# Patient Record
Sex: FEMALE | Race: WHITE
[De-identification: names, ages, dates, MRNs, and addresses within clinical notes are randomized per-mention and may not be internally consistent; named-entity substitution may affect disease eponyms.]

---

## 2018-05-03 ENCOUNTER — HOSPITAL ENCOUNTER (EMERGENCY)
Dept: HOSPITAL 97 - ER | Age: 28
Discharge: HOME | End: 2018-05-03
Payer: SELF-PAY
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
## 2018-05-03 DIAGNOSIS — T78.40XA: Primary | ICD-10-CM

## 2018-05-03 PROCEDURE — 99283 EMERGENCY DEPT VISIT LOW MDM: CPT

## 2018-05-03 PROCEDURE — 96361 HYDRATE IV INFUSION ADD-ON: CPT

## 2018-05-03 PROCEDURE — 96375 TX/PRO/DX INJ NEW DRUG ADDON: CPT

## 2018-05-03 PROCEDURE — 96374 THER/PROPH/DIAG INJ IV PUSH: CPT

## 2018-05-03 PROCEDURE — 96372 THER/PROPH/DIAG INJ SC/IM: CPT

## 2018-05-03 NOTE — ER
Nurse's Notes                                                                                     

 [facility]                                                                

Name: Jessica Fuentes                                                                             

Age: 28 yrs                                                                                       

Sex: Female                                                                                       

: 1990                                                                                   

MRN: [id number]                                                                                   

Arrival Date: 2018                                                                          

Time: 15:22                                                                                       

Account#: [id number]                                                                            

Bed 7                                                                                             

Private MD: None, None                                                                            

Diagnosis: Acute allergic reaction                                                                

                                                                                                  

Presentation:                                                                                     

                                                                                             

15:41 Presenting complaint: Patient states: im allergic to nuts, at work, i ate a chicken     hj  

      mini, made in peanut oil, now i feel smothering, i feel tingling on all over my face,       

      rfeels flush all over my face, rash, throat is itchy and feels tight;. Transition of        

      care: patient was not received from another setting of care. Onset: The                     

      symptoms/episode began/occurred this morning. Anaphylaxis evaluation, no signs or           

      symptoms of anaphylaxis were noted. Onset of symptoms was May 03, 2018. Initial Sepsis      

      Screen: Does the patient meet any 2 criteria? No. Patient's initial sepsis screen is        

      negative. Does the patient have a suspected source of infection? No. Patient's initial      

      sepsis screen is negative. Care prior to arrival: None.                                     

15:41 Method Of Arrival: Ambulatory                                                             

15:41 Acuity: ELVIA 3                                                                           hj  

                                                                                                  

Triage Assessment:                                                                                

15:44 General: Appears in no apparent distress. uncomfortable, Behavior is calm, cooperative, hj  

      appropriate for age. Pain: Denies pain.                                                     

                                                                                                  

OB/GYN:                                                                                           

15:44 LMP 5/3/2018                                                                            hj  

                                                                                                  

Historical:                                                                                       

- Allergies:                                                                                      

15:43 Peanut;                                                                                 hj  

15:43 Tramadol HCl;                                                                           hj  

- Home Meds:                                                                                      

15:43 None [Active];                                                                          hj  

- PMHx:                                                                                           

15:43 None;                                                                                   hj  

- PSHx:                                                                                           

15:44 ; hand;                                                                        hj  

                                                                                                  

- Immunization history:: Adult Immunizations up to date.                                          

- Social history:: The patient lives with family, Smoking status: Patient/guardian                

  denies using tobacco.                                                                           

- Family history:: not pertinent.                                                                 

- Hospitalizations: : No recent hospitalization is reported.                                      

                                                                                                  

                                                                                                  

Screenin:08 Abuse screen: Denies threats or abuse. Denies injuries from another. Nutritional        aj  

      screening: No deficits noted. Tuberculosis screening: No symptoms or risk factors           

      identified. Fall Risk None identified.                                                      

                                                                                                  

Assessment:                                                                                       

15:44 Respiratory: Airway is patent Respiratory effort is even, unlabored, Respiratory        hj  

      pattern is regular, symmetrical,                                                            

16:08 General: Appears in no apparent distress. comfortable, Behavior is calm, cooperative,   aj  

      appropriate for age. Pain: Denies pain. Neuro: Level of Consciousness is awake, alert,      

      obeys commands, Oriented to person, place, time, situation. Respiratory: Reports            

      shortness of breath Airway is patent Respiratory effort is even, unlabored, Respiratory     

      pattern is regular, symmetrical. GI: No signs and/or symptoms were reported involving       

      the gastrointestinal system. Derm: Skin is pink, warm \T\ dry. Rash noted that is red, on   

      face and scalp.                                                                             

17:43 Reassessment: Patient appears in no apparent distress at this time. Patient and/or      aj  

      family updated on plan of care and expected duration. Pain level reassessed. Patient is     

      alert, oriented x 3, equal unlabored respirations, skin warm/dry/pink. Patient denies       

      pain at this time. Patient states feeling better. Patient states symptoms have improved.    

                                                                                                  

Vital Signs:                                                                                      

15:44  / 75; Pulse 92; Resp 18; Temp 98.2(TE); Pulse Ox 100% on R/A; Weight 80.74 kg;     

      Height 5 ft. 4 in. (162.56 cm);                                                             

16:08  / 81; Pulse 91; Resp 20; Pulse Ox 99% on R/A;                                    aj  

17:43  / 70; Pulse 96; Resp 22; Pulse Ox 100% on R/A;                                   aj  

15:44 Body Mass Index 30.55 (80.74 kg, 162.56 cm)                                               

                                                                                                  

ED Course:                                                                                        

15:22 Patient arrived in ED.                                                                  mr  

15:23 None, None is Private Physician.                                                        mr  

15:43 Triage completed.                                                                       hj  

15:44 Arm band placed on left wrist.                                                          hj  

15:48 Manny Candelaria MD is Attending Physician.                                             wa  

15:57 Oumou Marie, RN is Primary Nurse.                                                     aj  

16:08 Patient has correct armband on for positive identification.                             aj  

16:08 Inserted saline lock: 20 gauge in right antecubital area, using aseptic technique.      aj  

17:43 No provider procedures requiring assistance completed. IV discontinued, intact,         aj  

      bleeding controlled, No redness/swelling at site. Pressure dressing applied.                

                                                                                                  

Administered Medications:                                                                         

16:06 Drug: EPINEPHrine 1mg/mL 1:1,000 0.3 ml Route: Sub-Q; Site: right upper arm;            aj  

17:46 Follow up: Response: No adverse reaction; Marked relief of symptoms                     aj  

16:07 Drug: Decadron - Dexamethasone 10 mg Route: IVP; Site: right antecubital;               aj  

17:45 Follow up: Response: No adverse reaction; Marked relief of symptoms                     aj  

16: Drug: NS 0.9% 1000 ml Route: IV; Rate: 1 bolus; Site: right antecubital;                aj  

17:45 Follow up: Response: No adverse reaction; IV Status: Completed infusion; IV Intake:     aj  

      1000ml                                                                                      

16:07 Drug: Benadryl 12.5 mg Route: IVP; Site: right antecubital;                             aj  

17:45 Follow up: Response: No adverse reaction; Marked relief of symptoms                     aj  

                                                                                                  

                                                                                                  

Intake:                                                                                           

17:45 IV: 1000ml; Total: 1000ml.                                                              aj  

                                                                                                  

Outcome:                                                                                          

17:10 Discharge ordered by MD.                                                                wa  

17:43 Discharged to home ambulatory, with family.                                             aj  

17:43 Condition: good                                                                             

17:43 Discharge instructions given to patient, family, Instructed on discharge instructions,      

      follow up and referral plans. medication usage, Demonstrated understanding of               

      instructions, follow-up care, medications, Prescriptions given X 2.                         

17:46 Patient left the ED.                                                                    aj  

                                                                                                  

Signatures:                                                                                       

Oumou Marie, RN                       RN   Maria De Jesus Calloway Henry, RN                      RN   Manny Kelly MD MD wa                                                   

                                                                                                  

Corrections: (The following items were deleted from the chart)                                    

15:47 15:44 Pulse 92bpm; Resp 18bpm; Pulse Ox 100% RA; Temp 98.2F Temporal; 80.74 kg; Height  hj  

      5 ft. 4 in.; BMI: 30.5; hj                                                                  

                                                                                                  

**************************************************************************************************

## 2018-05-03 NOTE — EDPHYS
Physician Documentation                                                                           

 [facility]                                                                

Name: Jessica Fuentes                                                                             

Age: 28 yrs                                                                                       

Sex: Female                                                                                       

: 1990                                                                                   

MRN: [id number]                                                                                   

Arrival Date: 2018                                                                          

Time: 15:22                                                                                       

Account#: [id number]                                                                            

Bed 7                                                                                             

Private MD: None, None                                                                            

ED Physician Manny Candelaria                                                                      

HPI:                                                                                              

                                                                                             

15:59 This 28 yrs old  Female presents to ER via Ambulatory with complaints of       wa  

      Allergic Reaction.                                                                          

15:59 The patient presents with itching, rash, that is diffuse, redness of skin, shortness of wa  

      breath, throat feels tight. Onset: The symptoms/episode began/occurred 2 hour(s) ago.       

      Associated signs and symptoms: Pertinent positives: rash, shortness of breath,              

      swelling, Pertinent negatives: Altered mental status Light headed nausea. Possible          

      causes: nuts. At home the patient or guardian has treated the symptoms with nothing.        

      Severity of symptoms: At their worst the symptoms were moderate in the emergency            

      department the symptoms are unchanged. allergic to peanut. . The patient has not            

      recently seen a physician.                                                                  

                                                                                                  

OB/GYN:                                                                                           

15:44 LMP 5/3/2018                                                                              

                                                                                                  

Historical:                                                                                       

- Allergies:                                                                                      

15:43 Peanut;                                                                                   

15:43 Tramadol HCl;                                                                           hj  

- Home Meds:                                                                                      

15:43 None [Active];                                                                          hj  

- PMHx:                                                                                           

15:43 None;                                                                                   hj  

- PSHx:                                                                                           

15:44 ; hand;                                                                        hj  

                                                                                                  

- Immunization history:: Adult Immunizations up to date.                                          

- Social history:: The patient lives with family, Smoking status: Patient/guardian                

  denies using tobacco.                                                                           

- Family history:: not pertinent.                                                                 

- Hospitalizations: : No recent hospitalization is reported.                                      

                                                                                                  

                                                                                                  

ROS:                                                                                              

16:06 Constitutional: Negative for fever, chills, and weight loss, Eyes: Negative for injury, wa  

      pain, redness, and discharge, Neck: Negative for injury, pain, and swelling,                

      Cardiovascular: Negative for chest pain, palpitations, and edema, Abdomen/GI: Negative      

      for abdominal pain, nausea, vomiting, diarrhea, and constipation, Back: Negative for        

      injury and pain, : Negative for injury, bleeding, discharge, and swelling,                

      MS/Extremity: Negative for injury and deformity, Neuro: Negative for headache,              

      weakness, numbness, tingling, and seizure, Psych: Negative for depression, anxiety,         

      suicide ideation, homicidal ideation, and hallucinations.                                   

16:06 ENT: Positive for smothering sensation.                                                     

16:06 Cardiovascular: Negative for chest pain, edema, orthopnea, palpitations.                    

16:06 Respiratory: Positive for shortness of breath.                                              

16:06 Skin: Positive for rash, Negative for swelling.                                             

16:06 All other systems are negative.                                                             

                                                                                                  

Exam:                                                                                             

16:08 Constitutional:  This is a well developed, well nourished patient who is awake, alert,  wa  

      and in no acute distress. Head/Face:  Normocephalic, atraumatic. Eyes:  Pupils equal        

      round and reactive to light, extra-ocular motions intact.  Lids and lashes normal.          

      Conjunctiva and sclera are non-icteric and not injected.  Cornea within normal limits.      

      Periorbital areas with no swelling, redness, or edema. ENT:  Nares patent. No nasal         

      discharge, no septal abnormalities noted.  Tympanic membranes are normal and external       

      auditory canals are clear.  Oropharynx with no redness, swelling, or masses, exudates,      

      or evidence of obstruction, uvula midline.  Mucous membranes moist. Neck:  Trachea          

      midline, no thyromegaly or masses palpated, and no cervical lymphadenopathy.  Supple,       

      full range of motion without nuchal rigidity, or vertebral point tenderness.  No            

      Meningismus. Chest/axilla:  Normal chest wall appearance and motion.  Nontender with no     

      deformity.  No lesions are appreciated. Cardiovascular:  Regular rate and rhythm with a     

      normal S1 and S2.  No gallops, murmurs, or rubs.  Normal PMI, no JVD.  No pulse             

      deficits. Abdomen/GI:  Soft, non-tender, with normal bowel sounds.  No distension or        

      tympany.  No guarding or rebound.  No evidence of tenderness throughout. Back:  No          

      spinal tenderness.  No costovertebral tenderness.  Full range of motion. MS/ Extremity:     

       Pulses equal, no cyanosis.  Neurovascular intact.  Full, normal range of motion.           

      Neuro:  Awake and alert, GCS 15, oriented to person, place, time, and situation.            

      Cranial nerves II-XII grossly intact.  Motor strength 5/5 in all extremities.  Sensory      

      grossly intact.  Cerebellar exam normal.  Normal gait. Psych:  Awake, alert, with           

      orientation to person, place and time.  Behavior, mood, and affect are within normal        

      limits.                                                                                     

16:08 Respiratory: the patient does not display signs of respiratory distress,  Respirations:     

      normal, Breath sounds: mildly decreased bilaterally.                                        

16:08 Skin: diffuse patchy areas of redness. worse on ear lobes and behind the ears.              

                                                                                                  

Vital Signs:                                                                                      

15:44  / 75; Pulse 92; Resp 18; Temp 98.2(TE); Pulse Ox 100% on R/A; Weight 80.74 kg;   hj  

      Height 5 ft. 4 in. (162.56 cm);                                                             

16:08  / 81; Pulse 91; Resp 20; Pulse Ox 99% on R/A;                                    aj  

17:43  / 70; Pulse 96; Resp 22; Pulse Ox 100% on R/A;                                   aj  

15:44 Body Mass Index 30.55 (80.74 kg, 162.56 cm)                                             hj  

                                                                                                  

MDM:                                                                                              

15:48 Patient medically screened.                                                             wa  

16:09 Differential diagnosis: anaphylaxis, angioedema, urticaria.                             wa  

17:08 Data reviewed: vital signs, nurses notes, lab test result(s). Response to treatment:    wa  

      the patient's symptoms have markedly improved after treatment.                              

17:13 ED course: markedly improved. throat sensation resolved. rash improved prior to d/c.    wa  

                                                                                                  

                                                                                             

15:57 Order name: Cardiac monitoring; Complete Time: 16:08                                    wa  

                                                                                             

15:57 Order name: Pulse Ox Monitoring; Complete Time: 16:06                                   wa  

                                                                                             

15:57 Order name: Oxygen; Complete Time: 16:06                                                wa  

                                                                                                  

Administered Medications:                                                                         

16:06 Drug: EPINEPHrine 1mg/mL 1:1,000 0.3 ml Route: Sub-Q; Site: right upper arm;            aj  

17:46 Follow up: Response: No adverse reaction; Marked relief of symptoms                       

16:07 Drug: Decadron - Dexamethasone 10 mg Route: IVP; Site: right antecubital;               aj  

17:45 Follow up: Response: No adverse reaction; Marked relief of symptoms                       

16:07 Drug: NS 0.9% 1000 ml Route: IV; Rate: 1 bolus; Site: right antecubital;                aj  

17:45 Follow up: Response: No adverse reaction; IV Status: Completed infusion; IV Intake:     aj  

      1000ml                                                                                      

16:07 Drug: Benadryl 12.5 mg Route: IVP; Site: right antecubital;                             aj  

17:45 Follow up: Response: No adverse reaction; Marked relief of symptoms                       

                                                                                                  

                                                                                                  

Disposition:                                                                                      

18 17:10 Discharged to Home. Impression: Acute allergic reaction.                           

- Condition is Stable.                                                                            

                                                                                                  

- Prescriptions for Prednisone 20 mg Oral Tablet - take 2 tablet by ORAL route once               

  daily for 5 days; 10 tablet. EpiPen 0.3 mg Injection auto- injector - inject 1 pen by           

  INTRAMUSCULAR route as directed Inject into the outer portion of the thigh, through             

  clothing if necessary. Indicated in the emergency treatment of allergic reactions; 1            

  box.                                                                                            

- Medication Reconciliation Form, Thank You Letter, Antibiotic Education, Prescription            

  Opioid Use form.                                                                                

- Follow up: Private Physician; When: 2 - 3 days; Reason: Re-evaluation by your                   

  physician.                                                                                      

- Problem is new.                                                                                 

- Symptoms have improved.                                                                         

- Notes: take prednisone as prescribed. carry epipen and use in a severe allergic                 

  reaction situation as discussed with you                                                        

                                                                                                  

                                                                                                  

Signatures:                                                                                       

Oumou Marie RN RN aj Joaquin, Henry, RN RN                                                      

Manny Candelaria MD MD wa                                                   

                                                                                                  

Corrections: (The following items were deleted from the chart)                                    

17:46 17:10 2018 17:10 Discharged to Home. Impression: Acute allergic reaction.         aj  

      Condition is Stable. Forms are Medication Reconciliation Form, Thank You Letter,            

      Antibiotic Education, Prescription Opioid Use. Follow up: Private Physician; When: 2 -      

      3 days; Reason: Re-evaluation by your physician. Problem is new. Symptoms have              

      improved. wa                                                                                

                                                                                                  

**************************************************************************************************

## 2018-06-10 ENCOUNTER — HOSPITAL ENCOUNTER (EMERGENCY)
Dept: HOSPITAL 97 - ER | Age: 28
Discharge: HOME | End: 2018-06-10
Payer: SELF-PAY

## 2018-06-10 DIAGNOSIS — B02.9: Primary | ICD-10-CM

## 2018-06-10 DIAGNOSIS — Z91.010: ICD-10-CM

## 2018-06-10 DIAGNOSIS — Z88.0: ICD-10-CM

## 2018-06-10 DIAGNOSIS — Z88.6: ICD-10-CM

## 2018-06-10 DIAGNOSIS — I25.2: ICD-10-CM

## 2018-06-10 DIAGNOSIS — F17.210: ICD-10-CM

## 2018-06-10 PROCEDURE — 99283 EMERGENCY DEPT VISIT LOW MDM: CPT

## 2018-06-10 NOTE — ER
Nurse's Notes                                                                                     

 [facility]                                                                

Name: Jessica Fuentes                                                                             

Age: 28 yrs                                                                                       

Sex: Female                                                                                       

: 1990                                                                                   

MRN: [id number]                                                                                   

Arrival Date: 06/10/2018                                                                          

Time: 20:27                                                                                       

Account#: [id number]                                                                            

Bed 28                                                                                            

Private MD: None, None                                                                            

Diagnosis: Herpes zoster right upper thigh                                                        

                                                                                                  

Presentation:                                                                                     

06/10                                                                                             

21:00 Presenting complaint: Patient states: Reports she had a rash pop up about two or three  ea  

      days ago, that started when she got her shorts wet at work. States "it's in my inner        

      right thigh and it is draining yellow stuff and it hurts". Transition of care: patient      

      was not received from another setting of care. Onset of symptoms was Theresa 10, 2018.         

      Risk Assessment: Do you want to hurt yourself or someone else? Patient reports no           

      desire to harm self or others. Initial Sepsis Screen: Does the patient meet any 2           

      criteria? No. Patient's initial sepsis screen is negative. Does the patient have a          

      suspected source of infection? Yes:. Care prior to arrival: None.                           

21:00 Method Of Arrival: Ambulatory                                                           ea  

21:00 Acuity: ELVIA 5                                                                           ea  

                                                                                                  

Triage Assessment:                                                                                

21:10 General: Appears uncomfortable, Behavior is calm, cooperative, appropriate for age.     ea  

      Derm: Rash noted that is red, Bruising that is dark purple.                                 

21:10 Neuro: No deficits noted. Cardiovascular: No deficits noted. Cardiovascular: Reports    ea  

      since cardiac history, reports having heart attack last year. Respiratory: No deficits      

      noted.                                                                                      

                                                                                                  

OB/GYN:                                                                                           

21:00 LMP 5/15/2018                                                                           ea  

                                                                                                  

Historical:                                                                                       

- Allergies:                                                                                      

21:03 PENICILLINS;                                                                            tl3 

21:03 Peanut;                                                                                 tl3 

21:03 Tramadol HCl;                                                                           tl3 

- PMHx:                                                                                           

21:03 Myocardial infarction;                                                                  tl3 

- PSHx:                                                                                           

21:03 ;                                                                              tl3 

                                                                                                  

- Immunization history:: Adult Immunizations up to date.                                          

- Social history:: Smoking status: Patient uses tobacco products, smokes one pack                 

  cigarettes per day.                                                                             

- Ebola Screening: : No symptoms or risks identified at this time.                                

                                                                                                  

                                                                                                  

Screenin:03 Abuse screen: Denies threats or abuse. Nutritional screening: No deficits noted.        tl3 

      Tuberculosis screening: No symptoms or risk factors identified. Fall Risk None              

      identified.                                                                                 

                                                                                                  

Assessment:                                                                                       

20:51 General: Appears in no apparent distress. comfortable, well groomed, well developed,    tl3 

      well nourished, Behavior is calm, cooperative, appropriate for age. Pain: Complains of      

      pain in inner right thigh. Neuro: Level of Consciousness is awake, alert, obeys             

      commands, Oriented to person, place, time, situation, Appropriate for age.                  

      Cardiovascular: Heart tones S1 S2 present Patient's skin is warm and dry. Respiratory:      

      Airway is patent Respiratory effort is even, unlabored, Respiratory pattern is regular,     

      symmetrical. GI: No signs and/or symptoms were reported involving the gastrointestinal      

      system. : No signs and/or symptoms were reported regarding the genitourinary system.      

      EENT: No deficits noted. No signs and/or symptoms were reported regarding the EENT          

      system. Derm: Rash noted that is itchy, raised, recently shaved pubic area, several         

      areas of redness, pt popped it herself, but now feels it has spread under her skin.         

      Musculoskeletal: No deficits noted. No signs and/or symptoms reported regarding the         

      musculoskeletal system.                                                                     

                                                                                                  

Vital Signs:                                                                                      

21:03  / 70; Pulse 92; Resp 18; Pulse Ox 99% ;                                          tl3 

                                                                                                  

ED Course:                                                                                        

20:27 Patient arrived in ED.                                                                  ds1 

20:27 None, None is Private Physician.                                                        ds1 

20:44 Tami Mary, RN is Primary Nurse.                                                    rk2 

20:47 Rios Reed MD is Attending Physician.                                                    pkl 

21:00 Arm band placed on right wrist. Patient placed in an exam room, on a stretcher, on      ea  

      pulse oximetry.                                                                             

21:03 Patient has correct armband on for positive identification. Bed in low position. Call   tl3 

      light in reach. Side rails up X 1. Pulse ox on. NIBP on.                                    

21:03 No provider procedures requiring assistance completed. Patient did not have IV access   tl3 

      during this emergency room visit.                                                           

21:10 Triage completed.                                                                       ea  

                                                                                                  

Administered Medications:                                                                         

21:28 Drug: Tylenol #3 (300 mg-30 mg) 1 tablet Route: PO;                                     rk2 

21:41 Follow up: Response: No adverse reaction                                                rk2 

21:28 Drug: Acyclovir 800 mg Route: PO;                                                       rk2 

21:40 Follow up: Response: No adverse reaction                                                rk2 

                                                                                                  

                                                                                                  

Outcome:                                                                                          

21:16 Discharge ordered by MD.                                                                pkl 

21:39 Discharged to home ambulatory.                                                          rk2 

21:39 Condition: good                                                                             

21:39 Discharge instructions given to patient, Prescriptions given X 2.                           

21:40 Patient left the ED.                                                                    rk2 

                                                                                                  

Signatures:                                                                                       

Rios Reed MD MD   pkl                                                  

Shoshana Perez                                ds1                                                  

Olga Fuller RN                      Tami Alves ea, RN                      RN   rk2                                                  

Miya Robb RN                       RN   tl3                                                  

                                                                                                  

Corrections: (The following items were deleted from the chart)                                    

 21:10 General: Appears uncomfortable, christos sher  

 21:10 Derm: Rash noted that is red, Bruising that is dark purple, christos sher  

                                                                                                  

**************************************************************************************************

## 2018-06-10 NOTE — EDPHYS
Physician Documentation                                                                           

 [facility]                                                                

Name: Jessica Fuentes                                                                             

Age: 28 yrs                                                                                       

Sex: Female                                                                                       

: 1990                                                                                   

MRN: [id number]                                                                                   

Arrival Date: 06/10/2018                                                                          

Time: 20:27                                                                                       

Account#: [id number]                                                                            

Bed 28                                                                                            

Private MD: None, None                                                                            

ED Physician Rios Reed                                                                             

HPI:                                                                                              

06/10                                                                                             

21:10 This 28 yrs old  Female presents to ER via Un[facility] with complaints of Rash. pkl 

21:10 The rash is located on the right upper inner thigh. The rash can be described as        pkl 

      blister - like eruptions. Onset: The symptoms/episode began/occurred 5 day(s) ago.          

      Associated signs and symptoms: Pertinent positives: Pain.                                   

                                                                                                  

OB/GYN:                                                                                           

21:00 LMP 5/15/2018                                                                           ea  

                                                                                                  

Historical:                                                                                       

- Allergies:                                                                                      

21:03 PENICILLINS;                                                                            tl3 

21:03 Peanut;                                                                                 tl3 

21:03 Tramadol HCl;                                                                           tl3 

- PMHx:                                                                                           

21:03 Myocardial infarction;                                                                  tl3 

- PSHx:                                                                                           

21:03 ;                                                                              tl3 

                                                                                                  

- Immunization history:: Adult Immunizations up to date.                                          

- Social history:: Smoking status: Patient uses tobacco products, smokes one pack                 

  cigarettes per day.                                                                             

- Ebola Screening: : No symptoms or risks identified at this time.                                

                                                                                                  

                                                                                                  

ROS:                                                                                              

21:10 Eyes: Negative for injury, pain, redness, and discharge, ENT: Negative for injury,      pkl 

      pain, and discharge, Neck: Negative for injury, pain, and swelling, Cardiovascular:         

      Negative for chest pain, palpitations, and edema, Respiratory: Negative for shortness       

      of breath, cough, wheezing, and pleuritic chest pain, Abdomen/GI: Negative for              

      abdominal pain, nausea, vomiting, diarrhea, and constipation, Back: Negative for injury     

      and pain, : Negative for injury, bleeding, discharge, and swelling.                       

21:10 Skin: Positive for rash, of the right  upper  inner  thigh.                                 

21:10 Neuro: Negative for altered mental status.                                                  

                                                                                                  

Exam:                                                                                             

21:10 Head/Face:  Normocephalic, atraumatic. Eyes:  Pupils equal round and reactive to light, pkl 

      extra-ocular motions intact.  Lids and lashes normal.  Conjunctiva and sclera are           

      non-icteric and not injected.  Cornea within normal limits.  Periorbital areas with no      

      swelling, redness, or edema. ENT:  Nares patent. No nasal discharge, no septal              

      abnormalities noted.  Tympanic membranes are normal and external auditory canals are        

      clear.  Oropharynx with no redness, swelling, or masses, exudates, or evidence of           

      obstruction, uvula midline.  Mucous membranes moist. Neck:  Trachea midline, no             

      thyromegaly or masses palpated, and no cervical lymphadenopathy.  Supple, full range of     

      motion without nuchal rigidity, or vertebral point tenderness.  No Meningismus.             

      Chest/axilla:  Normal chest wall appearance and motion.  Nontender with no deformity.       

      No lesions are appreciated. Cardiovascular:  Regular rate and rhythm with a normal S1       

      and S2.  No gallops, murmurs, or rubs.  Normal PMI, no JVD.  No pulse deficits.             

      Respiratory:  Lungs have equal breath sounds bilaterally, clear to auscultation and         

      percussion.  No rales, rhonchi or wheezes noted.  No increased work of breathing, no        

      retractions or nasal flaring. Abdomen/GI:  Soft, non-tender, with normal bowel sounds.      

      No distension or tympany.  No guarding or rebound.  No evidence of tenderness               

      throughout. Back:  No spinal tenderness.  No costovertebral tenderness.  Full range of      

      motion. Neuro:  Awake and alert, GCS 15, oriented to person, place, time, and               

      situation.  Cranial nerves II-XII grossly intact.  Motor strength 5/5 in all                

      extremities.  Sensory grossly intact.  Cerebellar exam normal.  Normal gait.                

21:10 Skin: on the right  upper  inner  thigh.                                                    

21:10 Neuro: Orientation: is normal, Mentation: is normal, Cranial nerves: grossly normal,        

      Cerebellar function: is grossly normal, Motor: is normal, Gait: is steady.                  

21:10 Skin: rash can be described as blister - like  eruptions.                               pkl 

                                                                                                  

Vital Signs:                                                                                      

21:03  / 70; Pulse 92; Resp 18; Pulse Ox 99% ;                                          tl3 

                                                                                                  

MDM:                                                                                              

20:47 Patient medically screened.                                                             pkl 

21:10 Data reviewed: vital signs, nurses notes.                                               pkl 

                                                                                                  

Administered Medications:                                                                         

21:28 Drug: Tylenol #3 (300 mg-30 mg) 1 tablet Route: PO;                                     rk2 

21:41 Follow up: Response: No adverse reaction                                                rk2 

21:28 Drug: Acyclovir 800 mg Route: PO;                                                       rk2 

21:40 Follow up: Response: No adverse reaction                                                rk2 

                                                                                                  

                                                                                                  

Disposition:                                                                                      

06/10/18 21:16 Discharged to Home. Impression: Herpes zoster right upper thigh.                   

- Condition is Stable.                                                                            

                                                                                                  

- Prescriptions for Tylenol- Codeine #3 300-30 mg Oral Tablet - take 1 tablet by ORAL             

  route every 8 hours As needed; 20 tablet. Acyclovir 400 mg Oral Tablet - take 2                 

  tablet by ORAL route every 8 hours; 60 tablet.                                                  

- Medication Reconciliation Form, Thank You Letter, Antibiotic Education, Prescription            

  Opioid Use form.                                                                                

- Follow up: Private Physician; When: 2 - 3 days; Reason: Re-evaluation by your                   

  physician.                                                                                      

- Problem is new.                                                                                 

- Symptoms are unchanged.                                                                         

                                                                                                  

                                                                                                  

                                                                                                  

Signatures:                                                                                       

Rios Reed MD MD   pkl                                                  

Olga Fuller RN                      RN   ea                                                   

Tami Mary RN                      RN   rk2                                                  

Miya Robb RN                       RN   tl3                                                  

                                                                                                  

Corrections: (The following items were deleted from the chart)                                    

21:40 21:16 06/10/2018 21:16 Discharged to Home. Impression: Herpes zoster right upper thigh. rk2 

      Condition is Stable. Forms are Medication Reconciliation Form, Thank You Letter,            

      Antibiotic Education, Prescription Opioid Use. Follow up: Private Physician; When: 2 -      

      3 days; Reason: Re-evaluation by your physician. Problem is new. Symptoms are               

      unchanged. pkl                                                                              

                                                                                                  

**************************************************************************************************

## 2020-09-16 ENCOUNTER — HOSPITAL ENCOUNTER (EMERGENCY)
Facility: HOSPITAL | Age: 30
Discharge: HOME | End: 2020-09-17
Attending: EMERGENCY MEDICINE | Admitting: EMERGENCY MEDICINE

## 2020-09-16 DIAGNOSIS — K59.00 CONSTIPATION, UNSPECIFIED CONSTIPATION TYPE: Primary | ICD-10-CM

## 2020-09-16 LAB
6-ACETYL MORPHINE: NEGATIVE
ALBUMIN SERPL-MCNC: 3.96 G/DL (ref 3.5–5.2)
ALBUMIN/GLOB SERPL: 1.4 G/DL
ALP SERPL-CCNC: 53 U/L (ref 39–117)
ALT SERPL W P-5'-P-CCNC: 11 U/L (ref 1–33)
AMPHET+METHAMPHET UR QL: POSITIVE
ANION GAP SERPL CALCULATED.3IONS-SCNC: 11.4 MMOL/L (ref 5–15)
AST SERPL-CCNC: 19 U/L (ref 1–32)
B-HCG UR QL: NEGATIVE
BARBITURATES UR QL SCN: NEGATIVE
BASOPHILS # BLD AUTO: 0.06 10*3/MM3 (ref 0–0.2)
BASOPHILS NFR BLD AUTO: 0.8 % (ref 0–1.5)
BENZODIAZ UR QL SCN: NEGATIVE
BILIRUB SERPL-MCNC: 0.2 MG/DL (ref 0–1.2)
BILIRUB UR QL STRIP: NEGATIVE
BUN SERPL-MCNC: 7 MG/DL (ref 6–20)
BUN/CREAT SERPL: 10.1 (ref 7–25)
BUPRENORPHINE SERPL-MCNC: NEGATIVE NG/ML
CALCIUM SPEC-SCNC: 8.5 MG/DL (ref 8.6–10.5)
CANNABINOIDS SERPL QL: NEGATIVE
CHLORIDE SERPL-SCNC: 102 MMOL/L (ref 98–107)
CLARITY UR: CLEAR
CO2 SERPL-SCNC: 26.6 MMOL/L (ref 22–29)
COCAINE UR QL: NEGATIVE
COLOR UR: YELLOW
CREAT SERPL-MCNC: 0.69 MG/DL (ref 0.57–1)
DEPRECATED RDW RBC AUTO: 42.8 FL (ref 37–54)
EOSINOPHIL # BLD AUTO: 0.1 10*3/MM3 (ref 0–0.4)
EOSINOPHIL NFR BLD AUTO: 1.3 % (ref 0.3–6.2)
ERYTHROCYTE [DISTWIDTH] IN BLOOD BY AUTOMATED COUNT: 12.6 % (ref 12.3–15.4)
GFR SERPL CREATININE-BSD FRML MDRD: 99 ML/MIN/1.73
GLOBULIN UR ELPH-MCNC: 2.8 GM/DL
GLUCOSE SERPL-MCNC: 81 MG/DL (ref 65–99)
GLUCOSE UR STRIP-MCNC: NEGATIVE MG/DL
HCT VFR BLD AUTO: 36.1 % (ref 34–46.6)
HGB BLD-MCNC: 11.7 G/DL (ref 12–15.9)
HGB UR QL STRIP.AUTO: NEGATIVE
IMM GRANULOCYTES # BLD AUTO: 0.01 10*3/MM3 (ref 0–0.05)
IMM GRANULOCYTES NFR BLD AUTO: 0.1 % (ref 0–0.5)
KETONES UR QL STRIP: NEGATIVE
LEUKOCYTE ESTERASE UR QL STRIP.AUTO: NEGATIVE
LYMPHOCYTES # BLD AUTO: 1.71 10*3/MM3 (ref 0.7–3.1)
LYMPHOCYTES NFR BLD AUTO: 21.7 % (ref 19.6–45.3)
MCH RBC QN AUTO: 29.9 PG (ref 26.6–33)
MCHC RBC AUTO-ENTMCNC: 32.4 G/DL (ref 31.5–35.7)
MCV RBC AUTO: 92.3 FL (ref 79–97)
METHADONE UR QL SCN: NEGATIVE
MONOCYTES # BLD AUTO: 0.61 10*3/MM3 (ref 0.1–0.9)
MONOCYTES NFR BLD AUTO: 7.8 % (ref 5–12)
NEUTROPHILS NFR BLD AUTO: 5.38 10*3/MM3 (ref 1.7–7)
NEUTROPHILS NFR BLD AUTO: 68.3 % (ref 42.7–76)
NITRITE UR QL STRIP: NEGATIVE
NRBC BLD AUTO-RTO: 0 /100 WBC (ref 0–0.2)
OPIATES UR QL: NEGATIVE
OXYCODONE UR QL SCN: NEGATIVE
PCP UR QL SCN: NEGATIVE
PH UR STRIP.AUTO: 8 [PH] (ref 5–8)
PLATELET # BLD AUTO: 261 10*3/MM3 (ref 140–450)
PMV BLD AUTO: 11.1 FL (ref 6–12)
POTASSIUM SERPL-SCNC: 4 MMOL/L (ref 3.5–5.2)
PROT SERPL-MCNC: 6.8 G/DL (ref 6–8.5)
PROT UR QL STRIP: NEGATIVE
RBC # BLD AUTO: 3.91 10*6/MM3 (ref 3.77–5.28)
SODIUM SERPL-SCNC: 140 MMOL/L (ref 136–145)
SP GR UR STRIP: <=1.005 (ref 1–1.03)
UROBILINOGEN UR QL STRIP: NORMAL
WBC # BLD AUTO: 7.87 10*3/MM3 (ref 3.4–10.8)

## 2020-09-16 PROCEDURE — 80307 DRUG TEST PRSMV CHEM ANLYZR: CPT | Performed by: EMERGENCY MEDICINE

## 2020-09-16 PROCEDURE — 87177 OVA AND PARASITES SMEARS: CPT | Performed by: EMERGENCY MEDICINE

## 2020-09-16 PROCEDURE — 99283 EMERGENCY DEPT VISIT LOW MDM: CPT

## 2020-09-16 PROCEDURE — 87209 SMEAR COMPLEX STAIN: CPT | Performed by: EMERGENCY MEDICINE

## 2020-09-16 PROCEDURE — 85025 COMPLETE CBC W/AUTO DIFF WBC: CPT | Performed by: EMERGENCY MEDICINE

## 2020-09-16 PROCEDURE — 81025 URINE PREGNANCY TEST: CPT | Performed by: EMERGENCY MEDICINE

## 2020-09-16 PROCEDURE — 81003 URINALYSIS AUTO W/O SCOPE: CPT | Performed by: EMERGENCY MEDICINE

## 2020-09-16 PROCEDURE — 80053 COMPREHEN METABOLIC PANEL: CPT | Performed by: EMERGENCY MEDICINE

## 2020-09-17 VITALS
DIASTOLIC BLOOD PRESSURE: 68 MMHG | TEMPERATURE: 98.1 F | HEIGHT: 66 IN | WEIGHT: 162 LBS | RESPIRATION RATE: 16 BRPM | SYSTOLIC BLOOD PRESSURE: 113 MMHG | BODY MASS INDEX: 26.03 KG/M2 | OXYGEN SATURATION: 99 % | HEART RATE: 95 BPM

## 2020-09-17 RX ORDER — POLYETHYLENE GLYCOL 3350 17 G/17G
17 POWDER, FOR SOLUTION ORAL DAILY
Qty: 5 PACKET | Refills: 0 | Status: SHIPPED | OUTPATIENT
Start: 2020-09-17 | End: 2020-09-22

## 2020-09-17 NOTE — ED PROVIDER NOTES
Subjective   Patient has constipation, and white worms coming out of her skin      Hypertension  Severity:  Moderate  Onset quality:  Gradual  Timing:  Constant  Chronicity:  Chronic  Relieved by:  ACE inhibitors  Worsened by:  Nothing  Ineffective treatments:  None tried  Associated symptoms: nausea        Review of Systems   Constitutional: Positive for activity change.   HENT: Negative.    Eyes: Negative.    Respiratory: Negative.    Cardiovascular: Negative.    Gastrointestinal: Positive for constipation and nausea.   Endocrine: Negative.    Genitourinary: Negative.    Musculoskeletal: Negative.    Skin: Positive for rash.        Worms coming out of skin   Allergic/Immunologic: Negative.    Neurological: Negative.    Hematological: Negative.        No past medical history on file.    Allergies   Allergen Reactions   • Nuts Anaphylaxis   • Penicillins Shortness Of Breath       No past surgical history on file.    No family history on file.    Social History     Socioeconomic History   • Marital status: Single     Spouse name: Not on file   • Number of children: Not on file   • Years of education: Not on file   • Highest education level: Not on file           Objective   Physical Exam  Nursing note reviewed. Exam conducted with a chaperone present.   Constitutional:       Appearance: She is well-developed.   HENT:      Head: Normocephalic.      Mouth/Throat:      Mouth: Mucous membranes are moist.   Eyes:      Extraocular Movements: Extraocular movements intact.   Abdominal:      General: Abdomen is flat. Bowel sounds are normal.      Palpations: Abdomen is soft.   Skin:     Capillary Refill: Capillary refill takes less than 2 seconds.      Findings: Rash present.      Comments: Multiple bites bilat ankles   Neurological:      General: No focal deficit present.      Mental Status: She is alert.         Procedures           ED Course  ED Course as of Sep 17 0009   Thu Sep 17, 2020   0007 Discussed plan of care with  patient.  Advised symptoms worsen return to ED reviewed.  Advised to follow-up with PCP in 1 to 2 days.    [MH]      ED Course User Index  [MH] Josselin Conteh PA-C                                           [facility]    Final diagnoses:   Constipation, unspecified constipation type            Gold Watts MD  09/16/20 8410       Gold Watts MD  09/16/20 1456       Gold Watts MD  09/16/20 5015       Josselin oCnteh PA-C  09/17/20 0009

## 2020-09-19 LAB
O+P SPEC MICRO: NORMAL
O+P STL CONC: NORMAL